# Patient Record
Sex: MALE | Race: WHITE
[De-identification: names, ages, dates, MRNs, and addresses within clinical notes are randomized per-mention and may not be internally consistent; named-entity substitution may affect disease eponyms.]

---

## 2019-08-15 ENCOUNTER — HOSPITAL ENCOUNTER (OUTPATIENT)
Dept: HOSPITAL 7 - FB.SDS | Age: 49
Discharge: HOME | End: 2019-08-15
Payer: COMMERCIAL

## 2019-08-15 DIAGNOSIS — I10: ICD-10-CM

## 2019-08-15 DIAGNOSIS — K81.1: Primary | ICD-10-CM

## 2019-08-15 DIAGNOSIS — G47.33: ICD-10-CM

## 2019-08-15 DIAGNOSIS — I25.2: ICD-10-CM

## 2019-08-15 DIAGNOSIS — I25.10: ICD-10-CM

## 2019-08-15 DIAGNOSIS — Z88.8: ICD-10-CM

## 2019-08-15 DIAGNOSIS — F17.210: ICD-10-CM

## 2019-08-15 DIAGNOSIS — Z79.82: ICD-10-CM

## 2019-08-15 DIAGNOSIS — K76.0: ICD-10-CM

## 2019-08-15 DIAGNOSIS — Z79.899: ICD-10-CM

## 2019-08-15 DIAGNOSIS — E78.00: ICD-10-CM

## 2019-08-15 DIAGNOSIS — Z99.89: ICD-10-CM

## 2019-08-15 PROCEDURE — 88304 TISSUE EXAM BY PATHOLOGIST: CPT

## 2019-08-15 PROCEDURE — 0FT44ZZ RESECTION OF GALLBLADDER, PERCUTANEOUS ENDOSCOPIC APPROACH: ICD-10-PCS

## 2019-08-15 PROCEDURE — 47562 LAPAROSCOPIC CHOLECYSTECTOMY: CPT

## 2019-08-15 NOTE — PCM.OPNOTE
- General Post-Op/Procedure Note


Date of Surgery/Procedure: 08/15/19


Operative Procedure(s): Lap Rachel


Findings: 





Chronic Cholecystitis


Pre Op Diagnosis: Symptomatic Cholelithiasis


Post-Op Diagnosis: Same


Anesthesia Technique: General ET Tube


Primary Surgeon: Thomas J Mohs


Pathology: 





Gallbladder


EBL in mLs: 10


Complications: None


Condition: Good

## 2019-08-15 NOTE — OR
DATE OF OPERATION:  08/15/2019

 

SURGEON:  Thomas J Mohs, MD

 

PREOPERATIVE DIAGNOSIS:  Symptomatic cholelithiasis.

 

POSTOPERATIVE DIAGNOSIS:  Symptomatic cholelithiasis with chronic cholecystitis.

 

PROCEDURE:  Laparoscopic cholecystectomy.

 

ANESTHESIA:  General.

 

PROCEDURE IN DETAIL:  The patient was brought to the operating room, where

general endotracheal anesthesia was administered.  Time-out was performed.

Abdomen was clipped, prepped with ChloraPrep and draped sterilely.  An

infraumbilical incision was made and extended into the peritoneal cavity without

difficulty.  The Shaw cannula later was introduced and pneumoperitoneum

obtained.  General exploration revealed the  shunt was visualized and not

interfering with visualization.  Peritoneal surfaces were otherwise normal.

Liver is fatty infiltrated.  Gallbladder is small and shrunken with thickened

peritoneum on it.  This was grasped and retracted cephalad.  Table was placed in

reverse Trendelenburg position and rotated to the left.  The peritoneal fat was

peeled off the lower third of the gallbladder and followed down to the

confluence of the common bile duct.  The cystic artery was also visualized.  The

artery was doubly clipped proximally and once distally and then transected.

Further dissection around the gallbladder created circumferentially from its

lower base all the way to the common bile duct.  The cystic duct was milked 
back into

the gallbladder and then doubly clipped proximally and once distally and then

transected.  Gallbladder was then removed from the bed of the liver using

electrocautery with moderate difficulty because of fibrosis and dense adherence

to the bed of the liver.  There was another branch of the cystic artery coursing

along the medial side that was doubly clipped proximally and cauterized

distally.  Eventually, the gallbladder was completely freed up and brought out

through the umbilical port site.  Right upper quadrant was inspected and

irrigated and hemostasis assured.  All clips were in place.  Ports were removed

under direct vision and remained hemostatic.  Umbilical fascia was closed with

figure-of-eight 0 Vicryl.  Skin was closed with 4-0 Vicryl subcuticular sutures.

Benzoin and Steri-Strips were placed and Band-Aids applied.  The patient

tolerated the procedure well.  Estimated blood loss 10 mL.  He returned to

postanesthesia in stable condition.

 

Job#: [id number]

DD: 08/15/2019 0936

DT: 08/15/2019 1359 MEL/SANJEEV RODRIGUEZ

## 2019-08-15 NOTE — PCM.HPR
H & P Addendum review





- H & P Addendum Review


Date of Original H & P: 07/29/19


Date Reviewed: 08/15/19


Time Reviewed: 08:00


Patient was Examined: No Changes